# Patient Record
Sex: MALE | Race: WHITE | NOT HISPANIC OR LATINO | URBAN - METROPOLITAN AREA
[De-identification: names, ages, dates, MRNs, and addresses within clinical notes are randomized per-mention and may not be internally consistent; named-entity substitution may affect disease eponyms.]

---

## 2020-11-11 ENCOUNTER — EMERGENCY (EMERGENCY)
Facility: HOSPITAL | Age: 30
LOS: 1 days | Discharge: HOME | End: 2020-11-11
Attending: EMERGENCY MEDICINE
Payer: MEDICAID

## 2020-11-11 VITALS
HEIGHT: 70 IN | WEIGHT: 190.04 LBS | HEART RATE: 62 BPM | DIASTOLIC BLOOD PRESSURE: 81 MMHG | SYSTOLIC BLOOD PRESSURE: 126 MMHG | RESPIRATION RATE: 17 BRPM | TEMPERATURE: 99 F | OXYGEN SATURATION: 97 %

## 2020-11-11 PROCEDURE — 99283 EMERGENCY DEPT VISIT LOW MDM: CPT

## 2020-11-11 PROCEDURE — 99282 EMERGENCY DEPT VISIT SF MDM: CPT

## 2020-11-11 PROCEDURE — 90715 TDAP VACCINE 7 YRS/> IM: CPT

## 2020-11-11 RX ORDER — TETANUS TOXOID, REDUCED DIPHTHERIA TOXOID AND ACELLULAR PERTUSSIS VACCINE, ADSORBED 5; 2.5; 8; 8; 2.5 [IU]/.5ML; [IU]/.5ML; UG/.5ML; UG/.5ML; UG/.5ML
0.5 SUSPENSION INTRAMUSCULAR ONCE
Refills: 0 | Status: COMPLETED | OUTPATIENT
Start: 2020-11-11 | End: 2020-11-11

## 2020-11-11 NOTE — ED STATDOCS - NSFOLLOWUPINSTRUCTIONS_ED_ALL_ED_FT
- Follow up with your doctor within 2-3 days.   -- Any increased pain, redness, streaking (red lines), swelling, fever, chills please immediately return to ER.     Laceration    A laceration is a cut that goes through all of the layers of the skin and into the tissue that is right under the skin. Some lacerations heal on their own. Others need to be closed with skin adhesive strips, skin glue, stitches (sutures), or staples. Proper laceration care minimizes the risk of infection and helps the laceration to heal better.  If non-absorbable stitches or staples have been placed, they must be taken out within the time frame instructed by your healthcare provider.    SEEK IMMEDIATE MEDICAL CARE IF YOU HAVE ANY OF THE FOLLOWING SYMPTOMS: swelling around the wound, worsening pain, drainage from the wound, red streaking going away from your wound, inability to move finger or toe near the laceration, or discoloration of skin near the laceration.

## 2020-11-11 NOTE — ED STATDOCS - PHYSICAL EXAMINATION
Gen: Well appearing in NAD  Head: NC/AT  Neck: trachea midline  Resp:  No distress  Ext: + small 1 cm linear well approximated laceration noted to L palm, full ROM in all fingers, sensation intact and cap refill < 2 seconds.  Neuro:  A&O appears non focal  Skin:  Warm and dry as visualized  Psych:  Normal affect and mood

## 2020-11-11 NOTE — ED ADULT TRIAGE NOTE - CHIEF COMPLAINT QUOTE
Patient states that he cut his left palm on a piece of sheet metal while at work yesterday morning. Patient needs tetanus shot. Small laceration noted to palm

## 2020-11-11 NOTE — ED ADULT NURSE REASSESSMENT NOTE - NS ED NURSE REASSESS COMMENT FT1
Went into intake room #2 to medicated pt with Td immunization and pt was very upset that he was going to have to pay a $250 co-payment for this visit. When " the doctor didn't do anything, " pt informed that the MD came in and evaluated his laceration and deemed it not to require suturing, pt remains upset, pt refereed to speak with registration because I was not familiar with the insurance and billing side of ED visits, reasons for the td shot reviewed in detail for reasons why he needed it,pt refused and said " Ill be right back I gotta talk to that lady at the desk "( triage). pt never returned, md aware .

## 2020-11-11 NOTE — ED STATDOCS - PATIENT PORTAL LINK FT
You can access the FollowMyHealth Patient Portal offered by [facility] by registering at the following website: [email or web address]. By joining [employer]’s FollowMyHealth portal, you will also be able to view your health information using other applications (apps) compatible with our system.

## 2020-11-11 NOTE — ED STATDOCS - OBJECTIVE STATEMENT
31 y/o M pt with no PMHx presents to ED c/o laceration to L palm at work yesterday. Pt states cut his palm on a piece of sheet metal. Pt wrapped wound with gauze and has been cleaning it with betadine. Denies erythema, warmth or paresthesia in L hand. Last tetanus is unknown. No further complaints at this time. 29 y/o M pt with no PMHx presents to ED c/o laceration to L palm at work yesterday morning. Pt states cut his palm on a piece of sheet metal. Pt wrapped wound with gauze and has been cleaning it with betadine. Denies erythema, warmth or paresthesia in L hand. Last tetanus is unknown. No further complaints at this time.
